# Patient Record
Sex: MALE | URBAN - METROPOLITAN AREA
[De-identification: names, ages, dates, MRNs, and addresses within clinical notes are randomized per-mention and may not be internally consistent; named-entity substitution may affect disease eponyms.]

---

## 2022-11-14 ENCOUNTER — APPOINTMENT (RX ONLY)
Dept: URBAN - METROPOLITAN AREA CLINIC 139 | Facility: CLINIC | Age: 32
Setting detail: DERMATOLOGY
End: 2022-11-14

## 2022-11-14 DIAGNOSIS — M67.4 GANGLION: ICD-10-CM

## 2022-11-14 PROBLEM — M67.441 GANGLION, RIGHT HAND: Status: ACTIVE | Noted: 2022-11-14

## 2022-11-14 PROCEDURE — ? OTHER

## 2022-11-14 PROCEDURE — ? COUNSELING

## 2022-11-14 PROCEDURE — ? DEFER

## 2022-11-14 ASSESSMENT — LOCATION SIMPLE DESCRIPTION DERM: LOCATION SIMPLE: RIGHT RING FINGER

## 2022-11-14 ASSESSMENT — LOCATION DETAILED DESCRIPTION DERM: LOCATION DETAILED: RIGHT PROXIMAL DORSAL RING FINGER

## 2022-11-14 ASSESSMENT — LOCATION ZONE DERM: LOCATION ZONE: FINGER

## 2022-11-14 NOTE — PROCEDURE: DEFER
X Size Of Lesion In Cm (Optional): 0
Instructions (Optional): Patient is being referred for hand surgery at [facility]
Introduction Text (Please End With A Colon): The following procedure was deferred:
Detail Level: Simple

## 2022-11-14 NOTE — PROCEDURE: OTHER
Render Risk Assessment In Note?: no
Note Text (......Xxx Chief Complaint.): This diagnosis correlates with the
Detail Level: Zone
Other (Free Text): Per Hazel there will be no charge for todays visit